# Patient Record
Sex: MALE | Race: WHITE | NOT HISPANIC OR LATINO | Employment: OTHER | URBAN - METROPOLITAN AREA
[De-identification: names, ages, dates, MRNs, and addresses within clinical notes are randomized per-mention and may not be internally consistent; named-entity substitution may affect disease eponyms.]

---

## 2019-02-28 ENCOUNTER — THERAPY VISIT (OUTPATIENT)
Dept: PHYSICAL THERAPY | Facility: CLINIC | Age: 79
End: 2019-02-28
Payer: COMMERCIAL

## 2019-02-28 DIAGNOSIS — Z91.81 AT HIGH RISK FOR FALLS: Primary | ICD-10-CM

## 2019-02-28 PROCEDURE — 97110 THERAPEUTIC EXERCISES: CPT | Mod: GP | Performed by: PHYSICAL THERAPIST

## 2019-02-28 PROCEDURE — 97161 PT EVAL LOW COMPLEX 20 MIN: CPT | Mod: GP | Performed by: PHYSICAL THERAPIST

## 2019-02-28 NOTE — PROGRESS NOTES
[facility] Initial Evaluation  Subjective:  He has been dealing with balance issues since 2015.  The symptoms slowly progressed over the years. He has some neuropathy in B feet.  He has been dealing with diabetes for the past 10 years.  He notes that his R knee will give out on him.  He feels like he has weakness in B lower leg.  Walking is very limited due to the weather.  Walking ~2200 steps/day.  He lives in a 2-story home.  Requires use of hand rail for steps. He is doing a lot of furniture walking in the house.  He will use walking sticks outside on occasion.  He denies any falls or near falls in the last year.  He does some biceps curls and wall push-ups for exercise at home.  MD order 2/19/19.      The history is provided by the patient. No  was used.   General   Condition requiring PT:  Deconditioning, history of previous falls, neurological deficit and weakness  Associated condition:  Diabetes and fall  Chronicity:  Chronic  Pain location:  Right foot/ankle, left lower extremity, left foot/ankle and right lower extremity  Pain quality:  Burning and cramping (stinging)  Frequency:  Constant (cramping is intermittent)  Number scale:  5/10  Associated symptoms:  Loss of balance, numbness and loss of strength  Pain is:  Worse in the P.M.  Symptoms exacerbated by:  Nothing  Symptoms relieved by:  Activity/movement  Progression since onset:  Gradually worsening  General health as reported by patient:  Fair  Please check all that apply to your current or past medical history:  Diabetes and asthma  Medical allergies:  No  Other surgeries:  Cancer surgery and other (prostate, hernia)  Medications you are currently taking:  Anti-seizure medication and high blood pressure medication  Occupation comment:  Retired    Pt goal: improve walking tolerance ( work up to 35,000 steps/week)  Barriers at home/work:  Stairs  Red flags:  None as reported by the patient                       Objective:  System    Physical Exam    General     ROS  TUG 9 sec  Graham/56    MMT: knee ext: 5/5 B, knee flex 5/5 B, ankle DF/inv R 4/5, L 4+/5, DF/ever: R 4/5, L 4+/5, PF/inv: R 4/5, L 4-/5, PF/ever: R 4/5, L 4-/5    Tinnels: + R post knee, B post to medial malleolus, B fibular head    Gait Analysis: increased step width, poor push-off B, foot drop noted B    Assessment/Plan:    Patient is a 78 year old male with Balnace complaints.    Patient has the following significant findings with corresponding treatment plan.                Diagnosis 1:  Balance issues related to neuropathy/weakness of B lower leg    Pain -  hot/cold therapy, self management, education and home program  Decreased strength - therapeutic exercise, therapeutic activities and home program  Impaired balance - neuro re-education, gait training, therapeutic activities, adaptive equipment/assistive device and home program  Impaired gait - gait training, assistive devices and home program  Decreased function - therapeutic activities and home program    Therapy Evaluation Codes:   1) History comprised of:   Personal factors that impact the plan of care:      Age and Time since onset of symptoms.    Comorbidity factors that impact the plan of care are:      Diabetes.     Medications impacting care: High blood pressure.  2) Examination of Body Systems comprised of:   Body structures and functions that impact the plan of care:      Balance, neuropathy B lower leg.   Activity limitations that impact the plan of care are:      Running, Stairs, Standing and Walking.  3) Clinical presentation characteristics are:   Evolving/Changing.  4) Decision-Making    Low complexity using standardized patient assessment instrument and/or measureable assessment of functional outcome.  Cumulative Therapy Evaluation is: Low complexity.    Previous and current functional limitations:  (See Goal Flow Sheet for this information)    Short term and Long term goals: (See  Goal Flow Sheet for this information)     Communication ability:  Patient appears to be able to clearly communicate and understand verbal and written communication and follow directions correctly.  Treatment Explanation - The following has been discussed with the patient:   [id number]  This patient would benefit from PT intervention to resume normal activities.   Rehab potential is good.    Frequency:  1 X week, once daily  Duration:  for 6 weeks  Discharge Plan:  Achieve all LTG.  Independent in home treatment program.  Reach maximal therapeutic benefit.    Please refer to the daily flowsheet for treatment today, total treatment time and time spent performing 1:1 timed codes.     Kaleb Ortega,PT, DPT, OCS

## 2019-02-28 NOTE — LETTER
[facility] ATHLETIC [facility] PHYSICAL THERAPY  [address]  [facility] [id number] [phone]    2019    Re: Jacek Rucker   :   1940  MRN:  [id number]   REFERRING PHYSICIAN:   You Erickson    [facility] ATHLETIC [facility]    Date of Initial Evaluation:  19  Visits:  Rxs Used: 1  Reason for Referral:  At high risk for falls    EVALUATION SUMMARY    [facility] Initial Evaluation  Subjective:  He has been dealing with balance issues since .  The symptoms slowly progressed over the years. He has some neuropathy in B feet.  He has been dealing with diabetes for the past 10 years.  He notes that his R knee will give out on him.  He feels like he has weakness in B lower leg.  Walking is very limited due to the weather.  Walking ~2200 steps/day.  He lives in a 2-story home.  Requires use of hand rail for steps. He is doing a lot of furniture walking in the house.  He will use walking sticks outside on occasion.  He denies any falls or near falls in the last year.  He does some biceps curls and wall push-ups for exercise at home.  MD order 19.    The history is provided by the patient. No  was used.   General   Condition requiring PT:  Deconditioning, history of previous falls, neurological deficit and weakness  Associated condition:  Diabetes and fall  Chronicity:  Chronic  Pain location:  Right foot/ankle, left lower extremity, left foot/ankle and right lower extremity  Pain quality:  Burning and cramping (stinging)  Frequency:  Constant (cramping is intermittent)  Number scale:  5/10  Associated symptoms:  Loss of balance, numbness and loss of strength  Pain is:  Worse in the P.M.  Symptoms exacerbated by:  Nothing  Symptoms relieved by:  Activity/movement  Progression since onset:  Gradually worsening  General health as reported by patient:  Fair  Please check all that apply to your  current or past medical history:  Diabetes and asthma  Medical allergies:  No  Other surgeries:  Cancer surgery and other (prostate, hernia)  Medications you are currently taking:  Anti-seizure medication and high blood pressure medication  Occupation comment:  Retired  Pt goal: improve walking tolerance ( work up to 35,000 steps/week)  Barriers at home/work:  Stairs  Red flags:  None as reported by the patient             Objective:    TUG 9 sec  Graham/56  MMT: knee ext: 5/5 B, knee flex 5/5 B, ankle DF/inv R 4/5, L 4+/5, DF/ever: R 4/5, L 4+/5, PF/inv: R 4/5, L 4-/5, PF/ever: R 4/5, L 4-/5  Tinnels: + R post knee, B post to medial malleolus, B fibular head  Gait Analysis: increased step width, poor push-off B, foot drop noted B    Assessment/Plan:    Patient is a 78 year old male with Balnace complaints.    Patient has the following significant findings with corresponding treatment plan.                Diagnosis 1:  Balance issues related to neuropathy/weakness of B lower leg  Pain -  hot/cold therapy, self management, education and home program  Decreased strength - therapeutic exercise, therapeutic activities and home program  Impaired balance - neuro re-education, gait training, therapeutic activities, adaptive equipment/assistive device and home program  Impaired gait - gait training, assistive devices and home program  Decreased function - therapeutic activities and home program    Therapy Evaluation Codes:   1) History comprised of:   Personal factors that impact the plan of care:      Age and Time since onset of symptoms.    Comorbidity factors that impact the plan of care are:      Diabetes.     Medications impacting care: High blood pressure.  2) Examination of Body Systems comprised of:   Body structures and functions that impact the plan of care:      Balance, neuropathy B lower leg.   Activity limitations that impact the plan of care are:      Running, Stairs, Standing and Walking.  3) Clinical  presentation characteristics are:   Evolving/Changing.  4) Decision-Making    Low complexity using standardized patient assessment instrument and/or measureable assessment of functional outcome.  Cumulative Therapy Evaluation is: Low complexity.    Previous and current functional limitations:  (See Goal Flow Sheet for this information)    Short term and Long term goals: (See Goal Flow Sheet for this information)     Communication ability:  Patient appears to be able to clearly communicate and understand verbal and written communication and follow directions correctly.  Treatment Explanation - The following has been discussed with the patient:   [id number]  This patient would benefit from PT intervention to resume normal activities.   Rehab potential is good.      Frequency:  1 X week, once daily  Duration:  for 6 weeks  Discharge Plan:  Achieve all LTG.  Independent in home treatment program.  Reach maximal therapeutic benefit.      Thank you for your referral.    INQUIRIES  Therapist: Kaleb Ortega,PT, DPT, [facility]      [facility]  [address] [place], [place] [address]  [facility] [address]  Phone: [phone]  Fax: [phone]

## 2019-03-07 ENCOUNTER — THERAPY VISIT (OUTPATIENT)
Dept: PHYSICAL THERAPY | Facility: CLINIC | Age: 79
End: 2019-03-07
Payer: COMMERCIAL

## 2019-03-07 DIAGNOSIS — Z91.81 AT HIGH RISK FOR FALLS: ICD-10-CM

## 2019-03-07 PROCEDURE — 97110 THERAPEUTIC EXERCISES: CPT | Mod: GP | Performed by: PHYSICAL THERAPIST

## 2019-03-07 PROCEDURE — 97112 NEUROMUSCULAR REEDUCATION: CPT | Mod: GP | Performed by: PHYSICAL THERAPIST

## 2019-03-13 ENCOUNTER — THERAPY VISIT (OUTPATIENT)
Dept: PHYSICAL THERAPY | Facility: CLINIC | Age: 79
End: 2019-03-13
Payer: COMMERCIAL

## 2019-03-13 DIAGNOSIS — Z91.81 AT HIGH RISK FOR FALLS: ICD-10-CM

## 2019-03-13 PROCEDURE — 97112 NEUROMUSCULAR REEDUCATION: CPT | Mod: GP | Performed by: PHYSICAL THERAPIST

## 2019-03-13 PROCEDURE — 97110 THERAPEUTIC EXERCISES: CPT | Mod: GP | Performed by: PHYSICAL THERAPIST

## 2019-03-21 ENCOUNTER — THERAPY VISIT (OUTPATIENT)
Dept: PHYSICAL THERAPY | Facility: CLINIC | Age: 79
End: 2019-03-21
Payer: COMMERCIAL

## 2019-03-21 DIAGNOSIS — Z91.81 AT HIGH RISK FOR FALLS: ICD-10-CM

## 2019-03-21 PROCEDURE — 97110 THERAPEUTIC EXERCISES: CPT | Mod: GP | Performed by: PHYSICAL THERAPIST

## 2019-03-21 PROCEDURE — 97112 NEUROMUSCULAR REEDUCATION: CPT | Mod: GP | Performed by: PHYSICAL THERAPIST

## 2019-03-28 ENCOUNTER — THERAPY VISIT (OUTPATIENT)
Dept: PHYSICAL THERAPY | Facility: CLINIC | Age: 79
End: 2019-03-28
Payer: COMMERCIAL

## 2019-03-28 DIAGNOSIS — Z91.81 AT HIGH RISK FOR FALLS: ICD-10-CM

## 2019-03-28 PROCEDURE — 97110 THERAPEUTIC EXERCISES: CPT | Mod: GP | Performed by: PHYSICAL THERAPIST

## 2019-03-28 PROCEDURE — 97112 NEUROMUSCULAR REEDUCATION: CPT | Mod: GP | Performed by: PHYSICAL THERAPIST

## 2019-04-04 ENCOUNTER — THERAPY VISIT (OUTPATIENT)
Dept: PHYSICAL THERAPY | Facility: CLINIC | Age: 79
End: 2019-04-04
Payer: COMMERCIAL

## 2019-04-04 DIAGNOSIS — Z91.81 AT HIGH RISK FOR FALLS: ICD-10-CM

## 2019-04-04 PROCEDURE — 97112 NEUROMUSCULAR REEDUCATION: CPT | Mod: GP | Performed by: PHYSICAL THERAPIST

## 2019-04-04 PROCEDURE — 97110 THERAPEUTIC EXERCISES: CPT | Mod: GP | Performed by: PHYSICAL THERAPIST

## 2019-04-04 NOTE — PROGRESS NOTES
Subjective:  HPI                    Objective:  System    Physical Exam    General     ROS    Assessment/Plan:    DISCHARGE REPORT    Progress reporting period is from 2/28/19 to 4/4/19.       SUBJECTIVE  Subjective changes noted by patient:  overall he is doing pretty well, he is having some neuropathy pain in his feet, the neuropathy pain seems to be improving slowly, he thinks he is more sore today due to doing a lot of walking down at [place] yesterday    Current Pain level: 3/10.     Previous pain level was  NA Initial Pain level: 4/10.   Changes in function:  Yes (See Goal flowsheet attached for changes in current functional level)  Adverse reaction to treatment or activity: None    OBJECTIVE  Changes noted in objective findings:    Objective: TUG 10 sec, 30 sec sit<>stand 10 reps, DOUGLAS 48/5, MMT: ankle: PF/inv R 4+/5, L 4+/5, PF/ever: R 4+/5, L 4+/5, Ankle AROM: DF R 10, L 10     ASSESSMENT/PLAN  Updated problem list and treatment plan: Diagnosis 1:  Balance issues related to neuropathy/weakness of B lower leg    Pain -  home program  Decreased ROM/flexibility - home program  Impaired balance - home program  STG/LTGs have been met or progress has been made towards goals:  Yes (See Goal flow sheet completed today.)  Assessment of Progress: The patient has met all of their long term goals.  Self Management Plans:  Patient has been instructed in a home treatment program.  I have re-evaluated this patient and find that the nature, scope, duration and intensity of the therapy is appropriate for the medical condition of the patient.  Jacek continues to require the following intervention to meet STG and LTG's:  PT intervention is no longer required to meet STG/LTG.    Recommendations:  This patient is ready to be discharged from therapy and continue their home treatment program.    Please refer to the daily flowsheet for treatment today, total treatment time and time spent performing 1:1 timed codes.      Kaleb  Olmscheid,PT, DPT, OCS

## 2019-04-04 NOTE — LETTER
[facility] ATHLETIC [facility] PHYSICAL [facility]  [address]  [facility] [id number] [phone]    2019    Re: Jacek Rucker   :   1940  MRN:  [id number]   REFERRING PHYSICIAN:   You Erickson    [facility] ATHLETIC [facility]    Date of Initial Evaluation:  19  Visits:  Rxs Used: 6  Reason for Referral:  At high risk for falls    DISCHARGE REPORT    Progress reporting period is from 19 to 19.       SUBJECTIVE  Subjective changes noted by patient:  overall he is doing pretty well, he is having some neuropathy pain in his feet, the neuropathy pain seems to be improving slowly, he thinks he is more sore today due to doing a lot of walking down at [place] yesterday    Current Pain level: 3/10.     Previous pain level was  NA Initial Pain level: 4/10.   Changes in function:  Yes (See Goal flowsheet attached for changes in current functional level)  Adverse reaction to treatment or activity: None    OBJECTIVE  Changes noted in objective findings:    Objective: TUG 10 sec, 30 sec sit<>stand 10 reps, DOUGLAS 48/5, MMT: ankle: PF/inv R 4+/5, L 4+/5, PF/ever: R 4+/5, L 4+/5, Ankle AROM: DF R 10, L 10     ASSESSMENT/PLAN  Updated problem list and treatment plan: Diagnosis 1:  Balance issues related to neuropathy/weakness of B lower leg  Pain -  home program  Decreased ROM/flexibility - home program  Impaired balance - home program  STG/LTGs have been met or progress has been made towards goals:  Yes (See Goal flow sheet completed today.)  Assessment of Progress: The patient has met all of their long term goals.  Self Management Plans:  Patient has been instructed in a home treatment program.  I have re-evaluated this patient and find that the nature, scope, duration and intensity of the therapy is appropriate for the medical condition of the patient.  Jacek continues to require the following intervention to meet STG and LTG's:  PT intervention  is no longer required to meet STG/LTG.        Recommendations:  This patient is ready to be discharged from therapy and continue their home treatment program.            Thank you for your referral.    INQUIRIES  Therapist: Kaleb OrtegaPT, DPT, [facility] [facility]  [address] [place] [address]. [address]  [facility] [address]  Phone: [phone]  Fax: [phone]

## 2020-12-07 ENCOUNTER — THERAPY VISIT (OUTPATIENT)
Dept: PHYSICAL THERAPY | Facility: CLINIC | Age: 80
End: 2020-12-07
Payer: COMMERCIAL

## 2020-12-07 DIAGNOSIS — Z91.81 AT HIGH RISK FOR INJURY RELATED TO FALL: ICD-10-CM

## 2020-12-07 PROCEDURE — 97161 PT EVAL LOW COMPLEX 20 MIN: CPT | Mod: GP | Performed by: PHYSICAL THERAPIST

## 2020-12-07 PROCEDURE — 97110 THERAPEUTIC EXERCISES: CPT | Mod: GP | Performed by: PHYSICAL THERAPIST

## 2020-12-07 ASSESSMENT — ACTIVITIES OF DAILY LIVING (ADL)
HIGHEST_POTENTIAL_ADL_SCORE:: 84
TOTAL_ADL_ITEM_SCORE:: 40
ACTIVITIES_OF_DAILY_LIVING_MEASURE_SCORE(%):: 47.62

## 2020-12-07 NOTE — LETTER
[facility] ATHLETIC [facility] PHYSICAL THERAPY  [address]  [facility] [id number] [phone]    2020    Re: Jacek Rucker   :   1940  MRN:  [id number]   REFERRING PHYSICIAN:   You Erickson    [facility] ATHLETIC [facility] [place] [facility]    Date of Initial Evaluation: 2020  Visits:  Rxs Used: 1  Reason for Referral:  At high risk for injury related to fall    EVALUATION SUMMARY    [facility] Initial Evaluation  Subjective:  The history is provided by the patient. No  was used.   Patient Health History  Jacek Rucker being seen for unsteady gait.   Problem began: 3/7/2020.   Problem occurred: gradually    Pain is reported as 4/10 on pain scale.  General health as reported by patient is fair.  Pertinent medical Hx: asthma, cancer, diabetes, high blood pressure, and overweight.   Red flags:  Calf pain-swelling-warmth.  Medical allergies: adhesives (some tapes ).   Surgeries include:  Cancer surgery (prostate).    Current medications:  High blood pressure medication.    Current occupation is retired .   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               Therapist Generated HPI Evaluation  Problem details: Decreased feelings in feet, pins and needles in the feet, surfaces that are uneven give the most troubles, a fall about a month ago, another fall 8 months ago tripping over garden. Consistently furniture walks when inside, uses walking sticks when going for walks outdoors. Try to walk 4000 steps a day. Careful going down. .     Jacek Rucker is a 80 year old male with poor balance and deconditioning condition which occurred with diabetes.   A chronic condition, which his condition occurred: for unknown reasons.      Barriers to activity include unsure how to start.  Pt diagnosed with Type 2 diabetes.  Patient reports pain:  Left foot/ankle and right foot/ankle. Pain is described as aching and is  intermittent.  Pain is the same all the time.  Associated symptoms:  Loss of balance.  Symptoms are exacerbated by nothing  and relieved by nothing.                      Since onset symptoms are unchanged.  Previous treatment includes physical therapy.  There was moderate improvement following previous treatment.   Barriers include:  Stairs.    Re: Jacek Rucker   :   1940, page 2                  Ankle Objective Findings  Objective:   GAIT: shuffling gait, decreased foot clearance , toe drag     Foot Position in Standing: pes cavus      Manual Muscle Testing  (graded 0-5, measured at 0 degrees unless otherwise noted):                                                              Right                                                Left                                                  Hip flexion  4- 3+   Knee flexion  4 4-   PF 4+ 4+   DF 4 4   Knee ext  4+ 4-   Other:         (+ mild pain, ++ moderate pain, +++ severe pain)    Special Tests:     R foot + plantar fascia irritation  L foot intrinsic irritation.  Filament testing 4.08 (diminished protective sense)   Balance: 30 sec STS: 8  dynamic gait index           Palpation: medial calcaneal fat pad breakdown     Assessment/Plan:    Patient is a 80 year old male with both sides ankle complaints.    Patient has the following significant findings with corresponding treatment plan.                Diagnosis 1:  Decreased balance and sensation in bilateral feet consistent with diabetic neuropathy.  Impaired balance - neuro re-education, gait training, therapeutic activities and home program  Decreased proprioception - neuro re-education, gait training, therapeutic activities and home program  Impaired gait - gait training and home program  Impaired muscle performance - neuro re-education and home program  Decreased function - therapeutic activities, home program, function performance testing  Instability -  Therapeutic Activity  Therapeutic Exercise  Re:  Jacek CHAUHAN Alka   :   1940, page 3    Neuromuscular Re-education, home program.    Therapy Evaluation Codes:   1) History comprised of:   Personal factors that impact the plan of care:      Age.    Comorbidity factors that impact the plan of care are:      Asthma, Cancer, Diabetes, High blood pressure and Overweight.     Medications impacting care: High blood pressure.  2) Examination of Body Systems comprised of:   Body structures and functions that impact the plan of care:      Ankle.   Activity limitations that impact the plan of care are:      Standing and Walking.  3) Clinical presentation characteristics are:   Stable/Uncomplicated.  4) Decision-Making    Low complexity using standardized patient assessment instrument and/or measureable assessment of functional outcome.  Cumulative Therapy Evaluation is: Low complexity.    Previous and current functional limitations:  (See Goal Flow Sheet for this information)    Short term and Long term goals: (See Goal Flow Sheet for this information)     Communication ability:  Patient appears to be able to clearly communicate and understand verbal and written communication and follow directions correctly.  Treatment Explanation - The following has been discussed with the patient:   RX ordered/plan of care  Anticipated outcomes  Possible risks and side effects  This patient would benefit from PT intervention to resume normal activities.   Rehab potential is good.    Frequency:  1 X week, once daily  Duration:  for 8 weeks  Discharge Plan:  Achieve all LTG.  Independent in home treatment program.  Reach maximal therapeutic benefit.    Cali Moeller, JESSICA/ Kaleb Ortega PT, DPT, OCS    Thank you for your referral.    INQUIRIES  Therapist:  Kaleb Ortega PT, DPT, OCS  [facility]  [address] [place], [place] [address]  [facility] [address]  Phone: [phone]  Fax: [phone]

## 2020-12-07 NOTE — PROGRESS NOTES
[facility] Initial Evaluation  Subjective:  The history is provided by the patient. No  was used.   Patient Health History  Jacek Rucker being seen for unsteady gait.     Problem began: 3/7/2020.   Problem occurred: gradually    Pain is reported as 4/10 on pain scale.  General health as reported by patient is fair.  Pertinent medical history includes: asthma, cancer, diabetes, high blood pressure and overweight.   Red flags:  Calf pain-swelling-warmth.  Medical allergies: adhesives (some tapes ).   Surgeries include:  Cancer surgery (prostate).    Current medications:  High blood pressure medication.    Current occupation is retired .   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                  Therapist Generated HPI Evaluation  Problem details: Decreased feelings in feet, pins and needles in the feet, surfaces that are uneven give the most troubles, a fall about a month ago, another fall 8 months ago tripping over garden. Consistently furniture walks when inside, uses walking sticks when going for walks outdoors. Try to walk 4000 steps a day. Careful going down. .     Jacek Rucker is a 80 year old male with poor balance and deconditioning condition which occurred with diabetes.   This is a chronic condition.  Where condition occurred: for unknown reasons.      Barriers to activity include unsure how to start.  Pt has been diagnosed with Type 2 diabetes.    Patient reports pain:  Left foot/ankle and right foot/ankle. Pain is described as aching and is intermittent.  Pain is the same all the time.  Associated symptoms:  Loss of balance.  Symptoms are exacerbated by nothing  and relieved by nothing.                      Since onset symptoms are unchanged.    Previous treatment includes physical therapy.  There was moderate improvement following previous treatment.       Barriers include:  Stairs.                        Objective:  Jacek Rucker , : 1940, MRN:  [id number]  Physical Therapy Objective Findings  Subjective information, goals, clinical impression, daily documentation and other information found in EPISODES tab.  Ankle Objective Findings  Objective:   GAIT: shuffling gait, decreased foot clearance , toe drag     Foot Position in Standing: pes cavus      Manual Muscle Testing  (graded 0-5, measured at 0 degrees unless otherwise noted):                                                              Right                                                Left                                                  Hip flexion  4- 3+   Knee flexion  4 4-   PF 4+ 4+   DF 4 4   Knee ext  4+ 4-   Other:         (+ mild pain, ++ moderate pain, +++ severe pain)    Special Tests:     R foot + plantar fascia irritation  L foot intrinsic irritation.  Filament testing 4.08 (diminished protective sense)   Balance: 30 sec STS: 8 16/24 dynamic gait index           Palpation: medial calcaneal fat pad breakdown           System    Physical Exam    General     ROS    Assessment/Plan:    Patient is a 80 year old male with both sides ankle complaints.    Patient has the following significant findings with corresponding treatment plan.                Diagnosis 1:  Decreased balance and sensation in bilateral feet consistent with diabetic neuropathy.  Impaired balance - neuro re-education, gait training, therapeutic activities and home program  Decreased proprioception - neuro re-education, gait training, therapeutic activities and home program  Impaired gait - gait training and home program  Impaired muscle performance - neuro re-education and home program  Decreased function - therapeutic activities, home program and functional performance testing  Instability -  Therapeutic Activity  Therapeutic Exercise  Neuromuscular Re-education  home program    Therapy Evaluation Codes:   1) History comprised of:   Personal factors that impact the plan of care:      Age.    Comorbidity factors that impact  the plan of care are:      Asthma, Cancer, Diabetes, High blood pressure and Overweight.     Medications impacting care: High blood pressure.  2) Examination of Body Systems comprised of:   Body structures and functions that impact the plan of care:      Ankle.   Activity limitations that impact the plan of care are:      Standing and Walking.  3) Clinical presentation characteristics are:   Stable/Uncomplicated.  4) Decision-Making    Low complexity using standardized patient assessment instrument and/or measureable assessment of functional outcome.  Cumulative Therapy Evaluation is: Low complexity.    Previous and current functional limitations:  (See Goal Flow Sheet for this information)    Short term and Long term goals: (See Goal Flow Sheet for this information)     Communication ability:  Patient appears to be able to clearly communicate and understand verbal and written communication and follow directions correctly.  Treatment Explanation - The following has been discussed with the patient:   RX ordered/plan of care  Anticipated outcomes  Possible risks and side effects  This patient would benefit from PT intervention to resume normal activities.   Rehab potential is good.    Frequency:  1 X week, once daily  Duration:  for 8 weeks  Discharge Plan:  Achieve all LTG.  Independent in home treatment program.  Reach maximal therapeutic benefit.    Please refer to the daily flowsheet for treatment today, total treatment time and time spent performing 1:1 timed codes.     Cali Moeller, SPT, Kaleb Ortega, DPT OCS

## 2020-12-17 ENCOUNTER — THERAPY VISIT (OUTPATIENT)
Dept: PHYSICAL THERAPY | Facility: CLINIC | Age: 80
End: 2020-12-17
Payer: COMMERCIAL

## 2020-12-17 DIAGNOSIS — Z91.81 AT HIGH RISK FOR INJURY RELATED TO FALL: ICD-10-CM

## 2020-12-17 PROCEDURE — 97110 THERAPEUTIC EXERCISES: CPT | Mod: GP | Performed by: PHYSICAL THERAPIST

## 2020-12-17 PROCEDURE — 97112 NEUROMUSCULAR REEDUCATION: CPT | Mod: GP | Performed by: PHYSICAL THERAPIST

## 2020-12-23 ENCOUNTER — THERAPY VISIT (OUTPATIENT)
Dept: PHYSICAL THERAPY | Facility: CLINIC | Age: 80
End: 2020-12-23
Payer: COMMERCIAL

## 2020-12-23 DIAGNOSIS — Z91.81 AT HIGH RISK FOR INJURY RELATED TO FALL: ICD-10-CM

## 2020-12-23 PROCEDURE — 97112 NEUROMUSCULAR REEDUCATION: CPT | Mod: GP | Performed by: PHYSICAL THERAPY ASSISTANT

## 2020-12-23 PROCEDURE — 97110 THERAPEUTIC EXERCISES: CPT | Mod: GP | Performed by: PHYSICAL THERAPY ASSISTANT

## 2021-01-06 ENCOUNTER — THERAPY VISIT (OUTPATIENT)
Dept: PHYSICAL THERAPY | Facility: CLINIC | Age: 81
End: 2021-01-06
Payer: COMMERCIAL

## 2021-01-06 DIAGNOSIS — Z91.81 AT HIGH RISK FOR INJURY RELATED TO FALL: ICD-10-CM

## 2021-01-06 PROCEDURE — 97112 NEUROMUSCULAR REEDUCATION: CPT | Mod: GP | Performed by: PHYSICAL THERAPY ASSISTANT

## 2021-01-06 PROCEDURE — 97140 MANUAL THERAPY 1/> REGIONS: CPT | Mod: GP | Performed by: PHYSICAL THERAPY ASSISTANT

## 2021-01-20 ENCOUNTER — THERAPY VISIT (OUTPATIENT)
Dept: PHYSICAL THERAPY | Facility: CLINIC | Age: 81
End: 2021-01-20
Payer: COMMERCIAL

## 2021-01-20 DIAGNOSIS — Z91.81 AT HIGH RISK FOR INJURY RELATED TO FALL: ICD-10-CM

## 2021-01-20 PROCEDURE — 97110 THERAPEUTIC EXERCISES: CPT | Mod: GP | Performed by: PHYSICAL THERAPY ASSISTANT

## 2021-01-20 PROCEDURE — 97112 NEUROMUSCULAR REEDUCATION: CPT | Mod: GP | Performed by: PHYSICAL THERAPY ASSISTANT

## 2021-02-23 ENCOUNTER — THERAPY VISIT (OUTPATIENT)
Dept: PHYSICAL THERAPY | Facility: CLINIC | Age: 81
End: 2021-02-23
Payer: COMMERCIAL

## 2021-02-23 DIAGNOSIS — Z91.81 AT HIGH RISK FOR INJURY RELATED TO FALL: ICD-10-CM

## 2021-02-23 PROCEDURE — 97110 THERAPEUTIC EXERCISES: CPT | Mod: GP | Performed by: PHYSICAL THERAPY ASSISTANT

## 2021-02-23 PROCEDURE — 97112 NEUROMUSCULAR REEDUCATION: CPT | Mod: GP | Performed by: PHYSICAL THERAPY ASSISTANT

## 2021-02-23 NOTE — PROGRESS NOTES
PROGRESS  REPORT    Progress reporting period is from 12/7/20 to 2/23/21.      SUBJECTIVE  Subjective changes noted by patient:    Pt has been seen for 6 PT sessions, pt pleased with benefit of PT sessions/establishing a HEP.   Intermittent cramping in B calf muscles, much less often then prior to start of care. Pt reports an increase in sensation along plantar surface of B feet. Increased standing tolerance to 15 min. Pt reports slight decline in energy level/strength from recent oral surgery 3 weeks ago. No falls reported.    Current Pain level: 0/10    Initial Pain level: 4/10   Changes in function:  Yes (See Goal flowsheet attached for changes in current functional level)     Adverse reaction to treatment or activity: None     OBJECTIVE  Changes noted in objective findings: Yes, pt demonstrates improved strength, balance and gait mechanics.   B LE strength MMT grossly 4+/5 for B hip flex/ext/abd, knee ext/flex, ankle DF/PF/inv/ev.   Gait mechanics nearing normal without AD, no longer with toe drag or shuffled gait. Reciprocal gait on stairs using railing. DGI has improved from 16 to 20/24.   Sit to stand test has improved from 8 to 12 in 30 seconds.

## 2021-02-23 NOTE — LETTER
[facility]  [address]  [facility] [id number] [phone]    2021    Re: Jacek Rucker   :   1940  MRN:  [id number]   REFERRING PHYSICIAN:   You Erickson    [facility]    Date of Initial Evaluation:  20  Visits: 6  Rxs Used: 6  Reason for Referral:  At high risk for injury related to fall    PROGRESS  REPORT    Progress reporting period is from 20 to 21.      SUBJECTIVE  Subjective changes noted by patient:    Pt has been seen for 6 PT sessions, pt pleased with benefit of PT sessions/establishing a HEP.   Intermittent cramping in B calf muscles, much less often then prior to start of care. Pt reports an increase in sensation along plantar surface of B feet. Increased standing tolerance to 15 min. Pt reports slight decline in energy level/strength from recent oral surgery 3 weeks ago. No falls reported.    Current Pain level: 0/10    Initial Pain level: 4/10   Changes in function:  Yes (See Goal flowsheet attached for changes in current functional level)     Adverse reaction to treatment or activity: None     OBJECTIVE  Changes noted in objective findings: Yes, pt demonstrates improved strength, balance and gait mechanics.   B LE strength MMT grossly 4+/5 for B hip flex/ext/abd, knee ext/flex, ankle DF/PF/inv/ev.   Gait mechanics nearing normal without AD, no longer with toe drag or shuffled gait. Reciprocal gait on stairs using railing. DGI has improved from 16 to 20/24.   Sit to stand test has improved from 8 to 12 in 30 seconds.      ASSESSMENT/PLAN  Updated problem list and treatment plan: Diagnosis 1:  Decreased balance and sensation in B feet consistent w/ diabetic neuropathy  Pain -  hot/cold therapy, manual therapy, self management, education, directional preference exercise and home program  Decreased ROM/flexibility - manual therapy, therapeutic exercise,  therapeutic activity and home program  Decreased strength - therapeutic exercise, therapeutic activities and home program  Impaired balance - neuro re-education, therapeutic activities and home program  Decreased proprioception - neuro re-education, therapeutic activities and home program  Re: Jacek Rucker   :   1940  Page 2      Decreased function - therapeutic activities and home program  Progress toward STG/LTGs have been made:  Yes (See Goal flow sheet completed today.)  Assessment of Progress: The patient's condition is improving.  Patient is meeting short term goals and is progressing towards long term goals.  Self Management Plans:  Patient has been instructed in a home treatment program.  Patient  has been instructed in self management of symptoms.  I have re-evaluated this patient and find that the nature, scope, duration and intensity of the therapy is appropriate for the medical condition of the patient.  Jacek continues to require the following intervention to meet STG and LT's:  PT    Recommendations:  This patient would benefit from continued therapy.     Frequency:  2 X a month, once daily  Duration:  for 1 month  The progress note/discharge summary was written in collaboration with and reviewed by the physical therapist.        Thank you for your referral.    INQUIRIES    Therapist: Cj Oakes PTA, Amanda Hilligoss PT DPT  [facility]  [address] [place] [address]. [address]  [facility] [address]  Phone: [phone]  Fax: [phone]

## 2021-03-01 NOTE — PROGRESS NOTES
ASSESSMENT/PLAN  Updated problem list and treatment plan: Diagnosis 1:  Decreased balance and sensation in B feet consistent w/ diabetic neuropathy  Pain -  hot/cold therapy, manual therapy, self management, education, directional preference exercise and home program  Decreased ROM/flexibility - manual therapy, therapeutic exercise, therapeutic activity and home program  Decreased strength - therapeutic exercise, therapeutic activities and home program  Impaired balance - neuro re-education, therapeutic activities and home program  Decreased proprioception - neuro re-education, therapeutic activities and home program  Decreased function - therapeutic activities and home program  Progress toward STG/LTGs have been made:  Yes (See Goal flow sheet completed today.)  Assessment of Progress: The patient's condition is improving.  Patient is meeting short term goals and is progressing towards long term goals.  Self Management Plans:  Patient has been instructed in a home treatment program.  Patient  has been instructed in self management of symptoms.  I have re-evaluated this patient and find that the nature, scope, duration and intensity of the therapy is appropriate for the medical condition of the patient.  Jacek continues to require the following intervention to meet STG and LT's:  PT    Recommendations:  This patient would benefit from continued therapy.     Frequency:  2 X a month, once daily  Duration:  for 1 month      The progress note/discharge summary was written in collaboration with and reviewed by the physical therapist.    Please refer to the daily flowsheet for treatment today, total treatment time and time spent performing 1:1 timed codes.

## 2021-03-12 ENCOUNTER — THERAPY VISIT (OUTPATIENT)
Dept: PHYSICAL THERAPY | Facility: CLINIC | Age: 81
End: 2021-03-12
Payer: COMMERCIAL

## 2021-03-12 DIAGNOSIS — Z91.81 AT HIGH RISK FOR INJURY RELATED TO FALL: ICD-10-CM

## 2021-03-12 PROCEDURE — 97112 NEUROMUSCULAR REEDUCATION: CPT | Mod: GP | Performed by: PHYSICAL THERAPY ASSISTANT

## 2021-03-12 PROCEDURE — 97110 THERAPEUTIC EXERCISES: CPT | Mod: GP | Performed by: PHYSICAL THERAPY ASSISTANT

## 2021-03-26 ENCOUNTER — THERAPY VISIT (OUTPATIENT)
Dept: PHYSICAL THERAPY | Facility: CLINIC | Age: 81
End: 2021-03-26
Payer: COMMERCIAL

## 2021-03-26 DIAGNOSIS — Z91.81 AT HIGH RISK FOR INJURY RELATED TO FALL: ICD-10-CM

## 2021-03-26 PROCEDURE — 97110 THERAPEUTIC EXERCISES: CPT | Mod: GP | Performed by: PHYSICAL THERAPY ASSISTANT

## 2021-03-26 PROCEDURE — 97112 NEUROMUSCULAR REEDUCATION: CPT | Mod: GP | Performed by: PHYSICAL THERAPY ASSISTANT

## 2021-03-26 ASSESSMENT — ACTIVITIES OF DAILY LIVING (ADL)
ACTIVITIES_OF_DAILY_LIVING_MEASURE_SCORE(%):: 58.33
HIGHEST_POTENTIAL_ADL_SCORE:: 84
TOTAL_ADL_ITEM_SCORE:: 49

## 2021-03-26 NOTE — LETTER
"[facility]  [address]  [facility] [id number] [phone]    2021    Re: Jacek Rucker   :   1940  MRN:  [id number]   REFERRING PHYSICIAN:   You BRANCH [facility]    Date of Initial Evaluation:  2020  Visits: 8  Rxs Used: 8  Reason for Referral:  At high risk for injury related to fall    PROGRESS  REPORT    Progress reporting period is from 21 to 3/26/21.      SUBJECTIVE    Subjective changes noted by patient:   Pt has been seen for 8 PT sessions, reporting 50% improved. Pt had been doing better until onset of fatigue/weakness which he feels is from recent oral surgery. Current complaints of feeling weak today, also had increased muscle cramping in L>R gastroc region last night for unknown reason. Pt will have intermittent low back pain that tends to reduce with activity. Overall has a decrease in numb sensation in B feet. Pt was pleased with improved balance although has felt more unsteady recently. Pt would like to continue PT to return to improvements he had made in January.    Current Pain level: 0/10    Initial Pain level: 4/10   Changes in function:  Yes (See Goal flowsheet attached for changes in current functional level)     Adverse reaction to treatment or activity: None     OBJECTIVE    Changes noted in objective findings:   DGI has declined from 20 to 16/24 (original score 16/24 on eval).   Sit<> 30\" =10, improved from 8, at best 12.   B LE strength MMT: R knee ext 4/5, L 4+/5, R knee flex 4+5, L 5-/5, B ankle DF 4/5, B PF 4+/5.   FAAM score has improved from 48-58%.      ASSESSMENT/PLAN    Updated problem list and treatment plan: Diagnosis 1:  Decreased balance and sensation in B feet consistent with diabetic neuropathy  Pain -  manual therapy, self management, education and home program  Decreased ROM/flexibility - manual therapy, therapeutic exercise, therapeutic " activity and home program  Re: Jacek Rucker   :   1940  Page 2      Decreased strength - therapeutic exercise, therapeutic activities and home program  Decreased proprioception - neuro re-education, therapeutic activities and home program  Impaired gait - gait training, assistive devices and home program  Decreased function - therapeutic activities and home program  STG/LTGs have been met:  Yes (See Goal flow sheet completed today.)  Progress toward STG/LTGs have been made:  Yes (See Goal flow sheet completed today.) and initial progress made, extending current goal range due to set back of progress in last weeks  Assessment of Progress: The patient has had set backs in their progress.  Self Management Plans:  Patient has been instructed in a home treatment program.  Patient  has been instructed in self management of symptoms.  I have re-evaluated this patient and find that the nature, scope, duration and intensity of the therapy is appropriate for the medical condition of the patient.  Odalys continues to require the following intervention to meet STG and LT's:  PT    Recommendations:    This patient would benefit from continued therapy.     Frequency:  1/ X week, once daily, 2 weeks, then 1x/2 weeks for 4 weeks  Duration:  for 6 weeks        I have provided on-site observation of the PTA and confirm the treatment is appropriate and the plan of care is being followed.      Thank you for your referral.    INQUIRIES    Therapist: Kaleb Ortega,PT, DPT, OCS  [facility] [address]  [facility] [address]  Phone: [phone]  Fax: [phone]

## 2021-03-26 NOTE — PROGRESS NOTES
"PROGRESS  REPORT    Progress reporting period is from 2/23/21 to 3/26/21.      SUBJECTIVE  Subjective changes noted by patient:   Pt has been seen for 8 PT sessions, reporting 50% improved. Pt had been doing better until onset of fatigue/weakness which he feels is from recent oral surgery. Current complaints of feeling weak today, also had increased muscle cramping in L>R gastroc region last night for unknown reason. Pt will have intermittent low back pain that tends to reduce with activity. Overall has a decrease in numb sensation in B feet. Pt was pleased with improved balance although has felt more unsteady recently. Pt would like to continue PT to return to improvements he had made in January.    Current Pain level: 0/10    Initial Pain level: 4/10   Changes in function:  Yes (See Goal flowsheet attached for changes in current functional level)     Adverse reaction to treatment or activity: None     OBJECTIVE  Changes noted in objective findings:   DGI has declined from 20 to 16/24 (original score 16/24 on eval).   Sit<> 30\" =10, improved from 8, at best 12.   B LE strength MMT: R knee ext 4/5, L 4+/5, R knee flex 4+5, L 5-/5, B ankle DF 4/5, B PF 4+/5.   FAAM score has improved from 48-58%.          ASSESSMENT/PLAN  Updated problem list and treatment plan: Diagnosis 1:  Decreased balance and sensation in B feet consistent with diabetic neuropathy  Pain -  manual therapy, self management, education and home program  Decreased ROM/flexibility - manual therapy, therapeutic exercise, therapeutic activity and home program  Decreased strength - therapeutic exercise, therapeutic activities and home program  Decreased proprioception - neuro re-education, therapeutic activities and home program  Impaired gait - gait training, assistive devices and home program  Decreased function - therapeutic activities and home program  STG/LTGs have been met:  Yes (See Goal flow sheet completed today.)  Progress toward " STG/LTGs have been made:  Yes (See Goal flow sheet completed today.) and initial progress made, extending current goal range due to set back of progress in last weeks  Assessment of Progress: The patient has had set backs in their progress.  Self Management Plans:  Patient has been instructed in a home treatment program.  Patient  has been instructed in self management of symptoms.  I have re-evaluated this patient and find that the nature, scope, duration and intensity of the therapy is appropriate for the medical condition of the patient.  Odalys continues to require the following intervention to meet STG and LT's:  PT    Recommendations:  This patient would benefit from continued therapy.     Frequency:  1/ X week, once daily, 2 weeks, then 1x/2 weeks for 4 weeks  Duration:  for 6 weeks        Please refer to the daily flowsheet for treatment today, total treatment time and time spent performing 1:1 timed codes.      Kaleb Ortega,PT, DPT, OCS

## 2021-03-29 NOTE — PROGRESS NOTES
I have provided on-site observation of the PTA and confirm the treatment is appropriate and the plan of care is being followed.      Kaleb Ortega,PT, DPT, OCS

## 2021-04-06 ENCOUNTER — THERAPY VISIT (OUTPATIENT)
Dept: PHYSICAL THERAPY | Facility: CLINIC | Age: 81
End: 2021-04-06
Payer: COMMERCIAL

## 2021-04-06 DIAGNOSIS — Z91.81 AT HIGH RISK FOR INJURY RELATED TO FALL: ICD-10-CM

## 2021-04-06 PROCEDURE — 97110 THERAPEUTIC EXERCISES: CPT | Mod: GP | Performed by: PHYSICAL THERAPY ASSISTANT

## 2021-04-06 PROCEDURE — 97112 NEUROMUSCULAR REEDUCATION: CPT | Mod: GP | Performed by: PHYSICAL THERAPY ASSISTANT

## 2021-04-20 ENCOUNTER — THERAPY VISIT (OUTPATIENT)
Dept: PHYSICAL THERAPY | Facility: CLINIC | Age: 81
End: 2021-04-20
Payer: COMMERCIAL

## 2021-04-20 DIAGNOSIS — Z91.81 AT HIGH RISK FOR INJURY RELATED TO FALL: ICD-10-CM

## 2021-04-20 PROCEDURE — 97110 THERAPEUTIC EXERCISES: CPT | Mod: GP | Performed by: PHYSICAL THERAPY ASSISTANT

## 2021-04-20 PROCEDURE — 97112 NEUROMUSCULAR REEDUCATION: CPT | Mod: GP | Performed by: PHYSICAL THERAPY ASSISTANT

## 2021-06-08 ENCOUNTER — THERAPY VISIT (OUTPATIENT)
Dept: PHYSICAL THERAPY | Facility: CLINIC | Age: 81
End: 2021-06-08
Payer: COMMERCIAL

## 2021-06-08 DIAGNOSIS — Z91.81 AT HIGH RISK FOR INJURY RELATED TO FALL: ICD-10-CM

## 2021-06-08 PROCEDURE — 97110 THERAPEUTIC EXERCISES: CPT | Mod: GP | Performed by: PHYSICAL THERAPY ASSISTANT

## 2021-06-08 PROCEDURE — 97112 NEUROMUSCULAR REEDUCATION: CPT | Mod: GP | Performed by: PHYSICAL THERAPY ASSISTANT

## 2021-08-10 PROBLEM — Z91.81 AT HIGH RISK FOR INJURY RELATED TO FALL: Status: RESOLVED | Noted: 2020-12-07 | Resolved: 2021-08-10

## 2021-08-10 NOTE — PROGRESS NOTES
Discharge Note    Progress reporting period is from last progress note on   to Jun 8, 2021.    Jacek failed to follow up and current status is unknown.  Please see information below for last relevant information on current status.  Patient seen for 11 visits.    SUBJECTIVE  Subjective changes noted by patient:  LBP abolished from starting exercises from last session. Notes increased strength in LE although still feels unsteady at times. Pt will walk daily for 20 min with walking stick.   .  Current pain level is 0/10.     Previous pain level was  4/10.   Changes in function:  Yes (See Goal flowsheet attached for changes in current functional level)  Adverse reaction to treatment or activity: None    OBJECTIVE  Changes noted in objective findings: DGI has returned to 19/24 from decline to 16 (2 months ago).      ASSESSMENT/PLAN  Diagnosis: balance training with pain in bilateral heels    Updated problem list and treatment plan:   Decreased function - HEP  Impaired balance - HEP  STG/LTGs have been met or progress has been made towards goals:  Yes, please see goal flowsheet for most current information  Assessment of Progress: current status is unknown.    Last current status: Pt is progressing as expected   Self Management Plans:  HEP  I have re-evaluated this patient and find that the nature, scope, duration and intensity of the therapy is appropriate for the medical condition of the patient.  Jacek continues to require the following intervention to meet STG and LTG's:  HEP.    Recommendations:  Discharge with current home program.  Patient to follow up with MD as needed.    Please refer to the daily flowsheet for treatment today, total treatment time and time spent performing 1:1 timed codes.    Kaleb Ortega,PT, DPT, OCS

## 2022-04-08 PROCEDURE — 87086 URINE CULTURE/COLONY COUNT: CPT | Mod: ORL | Performed by: PHYSICIAN ASSISTANT

## 2022-04-08 PROCEDURE — 81001 URINALYSIS AUTO W/SCOPE: CPT | Mod: ORL | Performed by: PHYSICIAN ASSISTANT

## 2022-05-12 ENCOUNTER — TRANSCRIBE ORDERS (OUTPATIENT)
Dept: OTHER | Age: 82
End: 2022-05-12

## 2022-05-12 DIAGNOSIS — M54.50 LOW BACK PAIN: Primary | ICD-10-CM

## 2022-06-22 ENCOUNTER — THERAPY VISIT (OUTPATIENT)
Dept: PHYSICAL THERAPY | Facility: CLINIC | Age: 82
End: 2022-06-22
Payer: COMMERCIAL

## 2022-06-22 DIAGNOSIS — R26.89 BALANCE PROBLEMS: ICD-10-CM

## 2022-06-22 DIAGNOSIS — M62.81 GENERALIZED MUSCLE WEAKNESS: ICD-10-CM

## 2022-06-22 DIAGNOSIS — R26.89 ANTALGIC GAIT: ICD-10-CM

## 2022-06-22 PROCEDURE — 97110 THERAPEUTIC EXERCISES: CPT | Mod: GP | Performed by: PHYSICAL THERAPIST

## 2022-06-22 PROCEDURE — 97162 PT EVAL MOD COMPLEX 30 MIN: CPT | Mod: GP | Performed by: PHYSICAL THERAPIST

## 2022-06-22 NOTE — PROGRESS NOTES
[facility]    OUTPATIENT Physical Therapy ORTHOPEDIC EVALUATION  PLAN OF TREATMENT FOR OUTPATIENT REHABILITATION  (COMPLETE FOR INITIAL CLAIMS ONLY)  Patient's Last Name, First Name, M.I.  YOB: 1940  Jacek Rucker    Provider s Name:  JOSE CARLOS [facility]   Medical Record No.  [id number]   Start of Care Date:  06/22/22   Onset Date:   06/21/22 (MD referral)   Type:     _X__PT   ___OT Medical Diagnosis:    Encounter Diagnoses   Name Primary?    Balance problems     Antalgic gait     Generalized muscle weakness         Treatment Diagnosis:  Balance and Gait        Goals:     06/22/22 0500   Body Part   Goals listed below are for Balance   Goal #1   Goal #1 balance   Previous Functional Level No issues    Current Functional Level Sit to stand test score;Timed Up and Go test score;Eyes open;Single leg stance   Performance level 7reps/30 sec and 15 sec TUG  (SLS x 1 sec)   STG Target Performance Increase;Timed Up and Go test score to;Sit to stand test score to;eyes open;single leg stance   Performance level Sit to Stand = 8/30, TUG = 14 sec and SLS EO = 5 sec   Rationale for safe household ambulation;for safe community ambulation;for safe showering;for safe dressing;for safe standing;for safe homemaking tasks   Due date 08/03/22   LTG Target Performance Increase;Sit to stand test score to;Timed Up and Go test score to;eyes open;single leg stance   Performance Level Sit to stand > 9 reps/30 sec, TUG = 12 sec and SLS EO > 5 sec   Rationale for safe household ambulation;for safe community ambulation;for safe showering;for safe dressing;for safe standing;for safe homemaking tasks   Due date 09/14/22       Therapy Frequency:  1 x week  Predicted Duration of Therapy Intervention:  12 weeks    Zaynab Samuels, PT                 I CERTIFY THE NEED FOR THESE SERVICES FURNISHED UNDER         THIS PLAN OF TREATMENT AND WHILE UNDER MY CARE .             Physician Signature               Date    X_____________________________________________________                         Certification Date From:  06/22/22   Certification Date To:  09/19/22    Referring Provider:  You Erickson    Initial Assessment        See Epic Evaluation SOC Date: 06/22/22

## 2022-06-22 NOTE — PROGRESS NOTES
Physical Therapy Initial Evaluation  Subjective:  The history is provided by the patient. No  was used.   Patient Health History  Jacek Rucker being seen for Balance problems.     Date of Onset: MD referral: 6/21/22.   Problem occurred: Unknown   Pain is reported as 4/10 (tingling and needles in feet) on pain scale.  General health as reported by patient is good.  Pertinent medical history includes: asthma, cancer, other, overweight, high blood pressure, numbness/tingling and diabetes. Other medical history details: COPD, peripheral Neuropathy.        Surgeries include:  Other and orthopedic surgery. Other surgery history details: Prostate, Lumbar decompression L1-5 April 2022.    Current medications:  High blood pressure medication. Other medications details: blood thinner, metformin and gabapentin.    Current occupation is Retired.   Primary job tasks include:  Other.   Other job/home tasks details: 3,000 steps/day with hiking sitck.                Therapist Generated HPI Evaluation     Jacek Rucker is a 82 year old male with poor balance condition which occurred with diabetes.   This is a chronic condition.  Where condition occurred: for unknown reasons.      Barriers to activity include. Pt has been diagnosed with Type 2 diabetes.     and is constant.                            Since onset symptoms are unchanged.            Barriers include:  None as reported by patient.                        Objective:    Gait:    Gait Type:  Antalgic   Assistive Devices:  None  Deviations:  General Deviations:  Chaparrita decr, stance time decr and stride length decr    Flexibility/Screens:       Lower Extremity:  Decreased left lower extremity flexibility:Hamstrings; Gastroc and Soleus    Decreased right lower extremity flexibility:  Hamstrings; Gastroc and Soleus          Physical Exam        General Evaluation:        Lower Extremity Strength:    Knee Extension: 4+      Knee Flexion: 5      Ankle  Plantarflexion: 3+      Ankle Dorsiflexion: 3                  Balance:  Balance wnl general: TUG = 15 sec   Single Leg Stance--Eyes Open:  Left: 1/30 sec    Right: 1/30 sec      Sit to Stand Test: 7/reps                                               ROS    Assessment/Plan:    Patient is a 82 year old male with balance complaints.    Patient has the following significant findings with corresponding treatment plan.                Diagnosis 1:  Balance & Gait  Decreased strength - therapeutic exercise, therapeutic activities and home program  Impaired balance - neuro re-education, gait training, therapeutic activities, adaptive equipment/assistive device and home program  Decreased proprioception - neuro re-education, gait training, therapeutic activities and home program  Impaired gait - gait training, assistive devices and home program  Impaired muscle performance - neuro re-education and home program  Decreased function - therapeutic activities and home program    Therapy Evaluation Codes:   1) History comprised of:   Personal factors that impact the plan of care:      Time since onset of symptoms and Peripheral Neuropathy.    Comorbidity factors that impact the plan of care are:      Asthma, Cancer, Diabetes, High blood pressure, Numbness/tingling and Overweight and COPD.   Medications impacting care: Gabapentin, Metformin and High Blood Pressure.  2) Examination of Body Systems comprised of:   Body structures and functions that impact the plan of care:      Legs.   Activity limitations that impact the plan of care are:      Squatting/kneeling, Stairs, Standing and Walking.  3) Clinical presentation characteristics are:   Evolving/Changing.  4) Decision-Making    Moderate complexity using standardized patient assessment instrument and/or measureable assessment of functional outcome.  Cumulative Therapy Evaluation is: Moderate complexity.    Previous and current functional limitations:  (See Goal Flow Sheet for  this information)    Short term and Long term goals: (See Goal Flow Sheet for this information)     Communication ability:  Patient appears to be able to clearly communicate and understand verbal and written communication and follow directions correctly.  Treatment Explanation - The following has been discussed with the patient:   RX ordered/plan of care  Anticipated outcomes  Possible risks and side effects  This patient would benefit from PT intervention to resume normal activities.   Rehab potential is fair.    Frequency:  1 X week, once daily  Duration:  for 12 weeks  Discharge Plan:  Achieve all LTG.  Independent in home treatment program.  Return to previous functional level by discharge.  Reach maximal therapeutic benefit.    Please refer to the daily flowsheet for treatment today, total treatment time and time spent performing 1:1 timed codes.

## 2022-07-13 ENCOUNTER — THERAPY VISIT (OUTPATIENT)
Dept: PHYSICAL THERAPY | Facility: CLINIC | Age: 82
End: 2022-07-13
Payer: COMMERCIAL

## 2022-07-13 DIAGNOSIS — R26.89 BALANCE PROBLEMS: Primary | ICD-10-CM

## 2022-07-13 DIAGNOSIS — M62.81 GENERALIZED MUSCLE WEAKNESS: ICD-10-CM

## 2022-07-13 DIAGNOSIS — R26.89 ANTALGIC GAIT: ICD-10-CM

## 2022-07-13 PROCEDURE — 97110 THERAPEUTIC EXERCISES: CPT | Mod: GP | Performed by: PHYSICAL THERAPY ASSISTANT

## 2022-07-13 PROCEDURE — 97112 NEUROMUSCULAR REEDUCATION: CPT | Mod: GP | Performed by: PHYSICAL THERAPY ASSISTANT

## 2022-07-13 PROCEDURE — 97530 THERAPEUTIC ACTIVITIES: CPT | Mod: GP | Performed by: PHYSICAL THERAPY ASSISTANT

## 2022-07-20 ENCOUNTER — THERAPY VISIT (OUTPATIENT)
Dept: PHYSICAL THERAPY | Facility: CLINIC | Age: 82
End: 2022-07-20
Payer: COMMERCIAL

## 2022-07-20 DIAGNOSIS — R26.89 BALANCE PROBLEMS: Primary | ICD-10-CM

## 2022-07-20 DIAGNOSIS — R26.89 ANTALGIC GAIT: ICD-10-CM

## 2022-07-20 DIAGNOSIS — M62.81 GENERALIZED MUSCLE WEAKNESS: ICD-10-CM

## 2022-07-20 PROCEDURE — 97112 NEUROMUSCULAR REEDUCATION: CPT | Mod: GP | Performed by: PHYSICAL THERAPY ASSISTANT

## 2022-07-20 PROCEDURE — 97110 THERAPEUTIC EXERCISES: CPT | Mod: GP | Performed by: PHYSICAL THERAPY ASSISTANT

## 2022-07-27 ENCOUNTER — THERAPY VISIT (OUTPATIENT)
Dept: PHYSICAL THERAPY | Facility: CLINIC | Age: 82
End: 2022-07-27
Payer: COMMERCIAL

## 2022-07-27 DIAGNOSIS — M62.81 GENERALIZED MUSCLE WEAKNESS: ICD-10-CM

## 2022-07-27 DIAGNOSIS — R26.89 ANTALGIC GAIT: ICD-10-CM

## 2022-07-27 DIAGNOSIS — R26.89 BALANCE PROBLEMS: Primary | ICD-10-CM

## 2022-07-27 PROCEDURE — 97110 THERAPEUTIC EXERCISES: CPT | Mod: GP | Performed by: PHYSICAL THERAPIST

## 2022-07-27 PROCEDURE — 97112 NEUROMUSCULAR REEDUCATION: CPT | Mod: GP | Performed by: PHYSICAL THERAPIST

## 2022-08-10 ENCOUNTER — THERAPY VISIT (OUTPATIENT)
Dept: PHYSICAL THERAPY | Facility: CLINIC | Age: 82
End: 2022-08-10
Payer: COMMERCIAL

## 2022-08-10 DIAGNOSIS — R26.89 BALANCE PROBLEMS: Primary | ICD-10-CM

## 2022-08-10 DIAGNOSIS — R26.89 ANTALGIC GAIT: ICD-10-CM

## 2022-08-10 DIAGNOSIS — M62.81 GENERALIZED MUSCLE WEAKNESS: ICD-10-CM

## 2022-08-10 PROCEDURE — 97112 NEUROMUSCULAR REEDUCATION: CPT | Mod: GP | Performed by: PHYSICAL THERAPY ASSISTANT

## 2022-08-10 PROCEDURE — 97110 THERAPEUTIC EXERCISES: CPT | Mod: GP | Performed by: PHYSICAL THERAPY ASSISTANT

## 2022-10-10 PROBLEM — R26.89 BALANCE PROBLEMS: Status: RESOLVED | Noted: 2022-06-22 | Resolved: 2022-10-10

## 2022-10-10 PROBLEM — M62.81 GENERALIZED MUSCLE WEAKNESS: Status: RESOLVED | Noted: 2022-06-22 | Resolved: 2022-10-10

## 2022-10-10 PROBLEM — R26.89 ANTALGIC GAIT: Status: RESOLVED | Noted: 2022-06-22 | Resolved: 2022-10-10

## 2022-10-10 NOTE — PROGRESS NOTES
Discharge Note    Progress reporting period is from initial evaluation date (please see noted date below) to Aug 10, 2022.  Linked Episodes   Type: Episode: Status: Noted: Resolved: Last update: Updated by:   PHYSICAL THERAPY Balance & Gait [id number] Active 6/22/2022  8/10/2022 10:35 AM Zaynab Samuels PT      Comments:       Jacek failed to follow up and current status is unknown.  Please see information below for last relevant information on current status.  Patient seen for 5 visits.    SUBJECTIVE  Subjective changes noted by patient:  Intermittent low back pain although doesn't prevent him from performing ADL's. Neuropathy in B feet is his primary complaint. Reports 70% improved following spine surgery.  .  Current pain level is 2/10.     Previous pain level was  4/10 (Numbness/needles feet).   Changes in function:  Yes (See Goal flowsheet attached for changes in current functional level)  Adverse reaction to treatment or activity: None    OBJECTIVE  Changes noted in objective findings: LROM WNL and painfree, does have stiffness in R lower back with side bending. Improved foot clearance with ambulation today.     ASSESSMENT/PLAN  Diagnosis: Balance and Gait   Updated problem list and treatment plan:   Pain - HEP  Decreased function - HEP  Impaired gait - HEP  STG/LTGs have been met or progress has been made towards goals:  Yes, please see goal flowsheet for most current information  Assessment of Progress: current status is unknown.    Last current status: Pt is progressing as expected   Self Management Plans:  HEP  I have re-evaluated this patient and find that the nature, scope, duration and intensity of the therapy is appropriate for the medical condition of the patient.  Jacek continues to require the following intervention to meet STG and LTG's:  HEP.    Recommendations:  Discharge with current home program.  Patient to follow up with MD as needed.    Please refer to the daily flowsheet for treatment  today, total treatment time and time spent performing 1:1 timed codes.    Kaleb Ortega,PT, DPT, OCS